# Patient Record
Sex: MALE
[De-identification: names, ages, dates, MRNs, and addresses within clinical notes are randomized per-mention and may not be internally consistent; named-entity substitution may affect disease eponyms.]

---

## 2022-05-18 ENCOUNTER — HOSPITAL ENCOUNTER (EMERGENCY)
Dept: HOSPITAL 97 - ER | Age: 27
Discharge: HOME | End: 2022-05-18
Payer: OTHER GOVERNMENT

## 2022-05-18 VITALS — SYSTOLIC BLOOD PRESSURE: 133 MMHG | DIASTOLIC BLOOD PRESSURE: 77 MMHG

## 2022-05-18 VITALS — OXYGEN SATURATION: 97 %

## 2022-05-18 VITALS — TEMPERATURE: 98 F

## 2022-05-18 DIAGNOSIS — J03.90: Primary | ICD-10-CM

## 2022-05-18 DIAGNOSIS — F43.10: ICD-10-CM

## 2022-05-18 LAB
ALBUMIN SERPL BCP-MCNC: 3.5 G/DL (ref 3.4–5)
ALP SERPL-CCNC: 63 U/L (ref 45–117)
ALT SERPL W P-5'-P-CCNC: 19 U/L (ref 12–78)
AST SERPL W P-5'-P-CCNC: 9 U/L (ref 15–37)
BUN BLD-MCNC: 13 MG/DL (ref 7–18)
BUN BLD-MCNC: 13 MG/DL (ref 7–18)
GLUCOSE SERPLBLD-MCNC: 121 MG/DL (ref 74–106)
GLUCOSE SERPLBLD-MCNC: 128 MG/DL (ref 74–106)
HCT VFR BLD CALC: 51.6 % (ref 39.6–49)
INR BLD: 1.33
LYMPHOCYTES # SPEC AUTO: 2.1 K/UL (ref 0.7–4.9)
MORPHOLOGY BLD-IMP: (no result)
PMV BLD: 7.9 FL (ref 7.6–11.3)
POTASSIUM SERPL-SCNC: 3.2 MMOL/L (ref 3.5–5.1)
POTASSIUM SERPL-SCNC: 3.3 MMOL/L (ref 3.5–5.1)
RBC # BLD: 5.85 M/UL (ref 4.33–5.43)

## 2022-05-18 PROCEDURE — 82947 ASSAY GLUCOSE BLOOD QUANT: CPT

## 2022-05-18 PROCEDURE — 85610 PROTHROMBIN TIME: CPT

## 2022-05-18 PROCEDURE — 85025 COMPLETE CBC W/AUTO DIFF WBC: CPT

## 2022-05-18 PROCEDURE — 80048 BASIC METABOLIC PNL TOTAL CA: CPT

## 2022-05-18 PROCEDURE — 85730 THROMBOPLASTIN TIME PARTIAL: CPT

## 2022-05-18 PROCEDURE — 96374 THER/PROPH/DIAG INJ IV PUSH: CPT

## 2022-05-18 PROCEDURE — 96375 TX/PRO/DX INJ NEW DRUG ADDON: CPT

## 2022-05-18 PROCEDURE — 70491 CT SOFT TISSUE NECK W/DYE: CPT

## 2022-05-18 PROCEDURE — 36415 COLL VENOUS BLD VENIPUNCTURE: CPT

## 2022-05-18 PROCEDURE — 80053 COMPREHEN METABOLIC PANEL: CPT

## 2022-05-18 PROCEDURE — 83605 ASSAY OF LACTIC ACID: CPT

## 2022-05-18 PROCEDURE — 99284 EMERGENCY DEPT VISIT MOD MDM: CPT

## 2022-05-18 NOTE — RAD REPORT
EXAM DESCRIPTION:  CT - Soft Tissue Neck W/Contr - 5/18/2022 11:12 am

 

CLINICAL HISTORY:   Neck pain with sore throat

 

COMPARISON:  None.

 

TECHNIQUE:   Computed axial tomography of the neck was obtained. 50 cc Isovue 300 was administered in
travenously. Coronal and sagittal reconstruction was performed.

 

All CT scans are performed using dose optimization technique as appropriate and may include automated
 exposure control or mA/KV adjustment according to patient size.

 

FINDINGS:  The tonsils are symmetrically enlarged. The parapharyngeal fat is clear. No peritonsillar 
abscess. The pharynx, tongue base, larynx and subglottic trachea appear unremarkable

 

The parotid, submandibular and thyroid glands appear unremarkable.

 

Bilateral neck lymphadenopathy

The sinuses and mastoids are clear.

 

IMPRESSION:  Bilateral tonsillar enlargement may indicate tonsillitis

 

Bilateral neck lymphadenopathy probably reactive in nature. As neoplasm can also have this appearance
 it is recommended that the patient have a followup ultrasound in 1 month to assess stability/resolut
ion

## 2022-05-18 NOTE — EDPHYS
Physician Documentation                                                                           

 [facility]                                                                 

Name: Kwaku Buchanan                                                                       

Age: 26 yrs                                                                                       

Sex: Male                                                                                         

: 1995                                                                                   

MRN: [id number]                                                                                   

Arrival Date: 2022                                                                          

Time: 09:59                                                                                       

Account#: [id number]                                                                            

Bed 20                                                                                            

Private MD:                                                                                       

ED Physician Mateusz Pereyra                                                                         

HPI:                                                                                              

                                                                                             

14:13 This 26 yrs old  Male presents to ER via EMS with complaints of Shortness Of    ms3 

      Breath, throat pain.                                                                        

14:14 The patient presents with sore throat. The patient describes throat pain as constant.   ms3 

      Onset: The symptoms/episode began/occurred acutely, 3 day(s) ago. Severity of symptoms:     

      At their worst the symptoms were severe, a " 8" out of "10", in the emergency               

      department the symptoms are unchanged. Modifying factors: The symptoms are alleviated       

      by nothing, the symptoms are aggravated by nothing, Patient's oral intake status:           

      unable to tolerate foods. Associated signs and symptoms: The patient has no apparent        

      associated signs or symptoms.                                                               

                                                                                                  

Historical:                                                                                       

- Allergies:                                                                                      

10:02 No Known Allergies;                                                                     bp  

- Home Meds:                                                                                      

10:02 None [Active];                                                                          bp  

- PMHx:                                                                                           

10:02 PTSD;                                                                                   bp  

                                                                                                  

- Immunization history:: Adult Immunizations up to date.                                          

- Social history:: Smoking status: Patient denies any tobacco usage or history of.                

                                                                                                  

                                                                                                  

ROS:                                                                                              

14:14 Constitutional: Negative for fever, and chills. Neck: Negative for injury, pain, and    ms3 

      swelling, Cardiovascular: Negative for chest pain, and palpitations. Abdomen/GI:            

      Negative for abdominal pain, nausea, vomiting, diarrhea, and constipation.                  

14:14 ENT: Positive for                                                                           

14:14 ENT: Positive for sore throat.                                                          ms3 

14:14 All other systems are negative.                                                             

14:14 Respiratory: Positive for shortness of breath.                                          ms3 

                                                                                                  

Exam:                                                                                             

14:14 Constitutional:  This is a well developed, well nourished patient who is awake, alert,  ms3 

      and in no acute distress. Head/Face:  Normocephalic, atraumatic. Neck:  Trachea             

      midline, no cervical lymphadenopathy.  Supple, full range of motion without nuchal          

      rigidity, or vertebral point tenderness.  No Meningismus. Chest/axilla:  Normal chest       

      wall appearance and motion.  Nontender with no deformity.   Cardiovascular:  Regular        

      rate and rhythm with a normal S1 and S2.  No gallops, murmurs, or rubs.  Normal PMI, no     

      JVD.  No pulse deficits. Respiratory:  Lungs have equal breath sounds bilaterally,          

      clear to auscultation and percussion.  No rales, rhonchi or wheezes noted.  No              

      increased work of breathing, no retractions or nasal flaring. Skin:  Warm, dry with         

      normal turgor.  Normal color with no rashes, no lesions, and no evidence of cellulitis.     

      Psych:  Awake, alert, with orientation to person, place and time.  Behavior, mood, and      

      affect are within normal limits.                                                            

14:14 ENT: Posterior pharynx: Airway: patent, Tonsils: bilaterally enlarged, with exudate,        

      swelling, that is marked, erythema, that is moderate, exudate, that is moderate,            

      peritonsillar mass, is not appreciated, pooling of secretions, is not appreciated.          

                                                                                                  

Vital Signs:                                                                                      

10:00  / 66; Pulse 100; Resp 24; Temp 98; Pulse Ox 100% on Nebulizer Mask;              bp  

11:00  / 97; Pulse 99; Resp 20; Pulse Ox 95% ;                                          bp  

12:00  / 91; Pulse 100; Resp 17; Pulse Ox 97% ;                                         bp  

13:33  / 77; Pulse 97; Resp 18; Pulse Ox 97% ;                                          bp  

                                                                                                  

MDM:                                                                                              

10:03 Patient medically screened.                                                             ms3 

14:14 Differential diagnosis: peritonsillar abscess pharyngitis, retropharyngeal abcess upper ms3 

      respiratory infection, viral syndrome. Data reviewed: vital signs, nurses notes, lab        

      test result(s), radiologic studies. Data interpreted: Pulse oximetry: on room air is 97     

      %. Interpretation: normal. Counseling: I had a detailed discussion with the patient         

      and/or guardian regarding: the historical points, exam findings, and any diagnostic         

      results supporting the discharge/admit diagnosis, lab results, radiology results, the       

      need for outpatient follow up, to return to the emergency department if symptoms worsen     

      or persist or if there are any questions or concerns that arise at home. ED course:         

      Discussed labs, CT, physical exam findings with patient. Patient to follow-up with Dr Ware in 1-2 days. Patient understands and agrees with plan. All questions were              

      answered. Return precautions discussed include worsening symptoms, or any other             

      concerns. On reevaluation patient is improved, alert and oriented x4, in no apparent        

      distress, nontoxic-appearing, speaking full sentences, ambulatory in emergency              

      department, tolerating po. .                                                                

                                                                                                  

                                                                                             

10:14 Order name: CBC with Diff; Complete Time: 13:13                                         ms3 

                                                                                             

10:14 Order name: BMP; Complete Time: 11:50                                                   ms3 

                                                                                             

11:14 Order name: CMP; Complete Time: 12:09                                                   ms3 

                                                                                             

11:14 Order name: Lactate; Complete Time: 12:09                                               ms3 

                                                                                             

11:14 Order name: Protime (+inr); Complete Time: 12:09                                        ms3 

                                                                                             

10:14 Order name: CT Soft Tissue Neck W/contr; Complete Time: 11:50                           ms3 

                                                                                             

11:14 Order name: Ptt, Activated; Complete Time: 12:09                                        ms3 

                                                                                             

11:14 Order name: Accucheck; Complete Time: 12:01                                             ms3 

                                                                                             

11:14 Order name: Cardiac monitoring; Complete Time: 12:01                                    ms3 

                                                                                             

11:45 Order name: Glucose, Ancillary Testing; Complete Time: 11:50                            EDMS

                                                                                             

13:11 Order name: Manual Differential; Complete Time: 13:13                                   EDMS

                                                                                             

11:14 Order name: IV Saline Lock - Large Bore; Complete Time: 11:17                           ms3 

                                                                                             

11:14 Order name: Labs collected and sent; Complete Time: 12:01                               ms3 

                                                                                             

11:14 Order name: O2 Per Protocol; Complete Time: 11:17                                       ms3 

                                                                                             

11:14 Order name: O2 Sat Monitoring; Complete Time: 11:17                                     ms3 

                                                                                                  

Administered Medications:                                                                         

10:20 Drug: Decadron - Dexamethasone 10 mg Route: IVP; Site: right antecubital;               bp  

11:17 Follow up: Response: No adverse reaction                                                bp  

10:20 Drug: Ativan (LORazepam) 0.5 mg Route: IVP; Site: right antecubital;                    bp  

11:16 Follow up: Response: No adverse reaction                                                bp  

                                                                                                  

                                                                                                  

Disposition Summary:                                                                              

22 12:15                                                                                    

Discharge Ordered                                                                                 

      Location: Home                                                                          ms3 

      Condition: Stable                                                                       ms3 

      Diagnosis                                                                                   

        - tonsilitis                                                                          ms3 

        - throat pain                                                                         ms3 

      Followup:                                                                               ms3 

        - With: Maria Fernanda Ware MD                                                                      

        - When: 1 - 2 days                                                                         

        - Reason: Recheck today's complaints                                                       

      Discharge Instructions:                                                                     

        - Discharge Summary Sheet                                                             ms3 

        - Sore Throat                                                                         ms3 

        - Strep Throat, Adult                                                                 ms3 

      Forms:                                                                                      

        - Medication Reconciliation Form                                                      ms3 

        - Work release form                                                                   ss  

        - Thank You Letter                                                                    ms3 

        - Antibiotic Education                                                                ms3 

        - Prescription Opioid Use                                                             ms3 

      Prescriptions:                                                                              

        - Cephalexin 500 mg Oral Capsule                                                           

            - take 1 capsule by ORAL route every 12 hours for 10 days; 20 capsule; Refills:   ms3 

      0, Product Selection Permitted                                                              

Signatures:                                                                                       

Dispatcher [employer]                           EDMS                                                 

Cj Nash, RN                      RN   bp                                                   

Mateusz Pereyra DO                        DO   ms3                                                  

                                                                                                  

Corrections: (The following items were deleted from the chart)                                    

12:02 11:14 EKG - Nurse/Tech ordered. ms3                                                     bp  

12:06 11:14 BLOOD CULTURE*+[id number].BRZ ordered. EDMS                                           EDMS

15:49 14:14 Constitutional: Negative for fever, and chills. Neck: Negative for injury, pain,  ms3 

      and swelling, Cardiovascular: Negative for chest pain, and palpitations. Respiratory:       

      Negative for shortness of breath, cough, wheezing, and pleuritic chest pain,                

      Abdomen/GI: Negative for abdominal pain, nausea, vomiting, diarrhea, and constipation,      

      ms3                                                                                         

                                                                                                  

**************************************************************************************************

## 2022-05-18 NOTE — ER
Nurse's Notes                                                                                     

 [facility]                                                                 

Name: Kwaku Buchanan                                                                       

Age: 26 yrs                                                                                       

Sex: Male                                                                                         

: 1995                                                                                   

MRN: [id number]                                                                                   

Arrival Date: 2022                                                                          

Time: 09:59                                                                                       

Account#: [id number]                                                                            

Bed 20                                                                                            

Private MD:                                                                                       

Diagnosis: tonsilitis;throat pain                                                                 

                                                                                                  

Presentation:                                                                                     

                                                                                             

10:00 Chief complaint: EMS states: [facility]ED [facility] FOR "[facility]". Coronavirus screen: bp  

      At this time, the client does not indicate any symptoms associated with coronavirus-19.     

      Ebola Screen: No symptoms or risks identified at this time. Initial Sepsis Screen: Does     

      the patient meet any 2 criteria? HR > 90 bpm. No. Patient's initial sepsis screen is        

      negative. Does the patient have a suspected source of infection? No. Patient's initial      

      sepsis screen is negative. Risk Assessment: Do you want to hurt yourself or someone         

      else? Patient reports no desire to harm self or others. Onset of symptoms was May 18,       

      2022 at 09:00. Care prior to arrival: Medication(s) given: EPI 2.3 MG IM, 25 BENADRYL       

      IM, 125 SOLU-MEDROL IM, DUO-NEB.                                                            

10:00 Method Of Arrival: EMS: [facility]                                                bp  

10:00 Acuity: NIMA 3                                                                           bp  

                                                                                                  

Triage Assessment:                                                                                

10:02 General: Appears distressed, uncomfortable, Behavior is cooperative, appropriate for    bp  

      age, agitated, anxious. Pain: Denies pain. EENT: No deficits noted. Neuro: No deficits      

      noted. Cardiovascular: Rhythm is sinus tachycardia. Respiratory: Reports shortness of       

      breath Airway is patent Onset: The symptoms/episode began/occurred this morning, the        

      patient reports symptoms have resolved. GI: No signs and/or symptoms were reported          

      involving the gastrointestinal system. : No signs and/or symptoms were reported           

      regarding the genitourinary system. Derm: No deficits noted. Musculoskeletal: No            

      deficits noted.                                                                             

                                                                                                  

Historical:                                                                                       

- Allergies:                                                                                      

10:02 No Known Allergies;                                                                     bp  

- Home Meds:                                                                                      

10:02 None [Active];                                                                          bp  

- PMHx:                                                                                           

10:02 PTSD;                                                                                   bp  

                                                                                                  

- Immunization history:: Adult Immunizations up to date.                                          

- Social history:: Smoking status: Patient denies any tobacco usage or history of.                

                                                                                                  

                                                                                                  

Screening:                                                                                        

10:05 Abuse screen: Denies threats or abuse. Denies injuries from another. Nutritional        bp  

      screening: No deficits noted. Tuberculosis screening: No symptoms or risk factors           

      identified. Fall Risk None identified.                                                      

                                                                                                  

Assessment:                                                                                       

10:05 General: SEE TRIAGE NOTE. Pain: Denies pain. Neuro: No deficits noted. Cardiovascular:  bp  

      No deficits noted. Rhythm is sinus tachycardia. Respiratory: Respiratory effort is          

      even, Respiratory pattern is regular, symmetrical, Breath sounds are clear bilaterally.     

      GI: No signs and/or symptoms were reported involving the gastrointestinal system. :       

      No signs and/or symptoms were reported regarding the genitourinary system. EENT: No         

      deficits noted. Derm: No deficits noted.                                                    

12:05 Reassessment: Patient states symptoms have improved.                                    bp  

13:33 Reassessment: PT D/C HOME AMBULATORY, DX WITH STREP TONSILITIS.                         bp  

                                                                                                  

Vital Signs:                                                                                      

10:00  / 66; Pulse 100; Resp 24; Temp 98; Pulse Ox 100% on Nebulizer Mask;              bp  

11:00  / 97; Pulse 99; Resp 20; Pulse Ox 95% ;                                          bp  

12:00  / 91; Pulse 100; Resp 17; Pulse Ox 97% ;                                         bp  

13:33  / 77; Pulse 97; Resp 18; Pulse Ox 97% ;                                          bp  

                                                                                                  

ED Course:                                                                                        

09:59 Patient arrived in ED.                                                                  bp  

10:02 Triage completed.                                                                       bp  

10:02 Mateusz Pereyra DO is Attending Physician.                                                ms3 

10:02 Arm band placed on.                                                                     bp  

10:05 Patient has correct armband on for positive identification. Bed in low position. Call   bp  

      light in reach. Side rails up X2.                                                           

10:21 Cj Nash, RN is Primary Nurse.                                                    bp  

10:22 Inserted saline lock: 20 gauge in right antecubital area, using aseptic technique.      bp  

      Blood collected.                                                                            

11:14 CT Soft Tissue Neck W/contr In Process Unspecified.                                     EDMS

12:15 Maria Fernanda Ware MD is Referral Physician.                                                    ms3 

13:34 No provider procedures requiring assistance completed. IV discontinued, intact,         bp  

      bleeding controlled, No redness/swelling at site. Pressure dressing applied.                

                                                                                                  

Administered Medications:                                                                         

10:20 Drug: Decadron - Dexamethasone 10 mg Route: IVP; Site: right antecubital;               bp  

11:17 Follow up: Response: No adverse reaction                                                bp  

10:20 Drug: Ativan (LORazepam) 0.5 mg Route: IVP; Site: right antecubital;                    bp  

11:16 Follow up: Response: No adverse reaction                                                bp  

                                                                                                  

                                                                                                  

Medication:                                                                                       

10:05 VIS not applicable for this client.                                                     bp  

                                                                                                  

Outcome:                                                                                          

12:15 Discharge ordered by MD.                                                                ms3 

13:34 Discharged to home ambulatory, with friend.                                             bp  

13:34 Condition: stable                                                                           

13:34 Discharge instructions given to patient, Instructed on discharge instructions, follow       

      up and referral plans. medication usage, Demonstrated understanding of instructions,        

      follow-up care, medications, Prescriptions given X 1.                                       

13:34 Patient left the ED.                                                                    bp  

                                                                                                  

Signatures:                                                                                       

Dispatcher [employer]                           Cj Stringer, RN                      RN   bp                                                   

Mateusz Pereyra DO                        DO   ms3                                                  

                                                                                                  

**************************************************************************************************